# Patient Record
Sex: MALE | Race: WHITE | Employment: OTHER
[De-identification: names, ages, dates, MRNs, and addresses within clinical notes are randomized per-mention and may not be internally consistent; named-entity substitution may affect disease eponyms.]

---

## 2017-01-05 ENCOUNTER — TELEPHONE (OUTPATIENT)
Dept: FAMILY MEDICINE CLINIC | Facility: CLINIC | Age: 52
End: 2017-01-05

## 2017-01-05 RX ORDER — PREDNISONE 20 MG/1
40 TABLET ORAL DAILY
Qty: 10 TABLET | Refills: 0 | Status: SHIPPED | OUTPATIENT
Start: 2017-01-05 | End: 2017-01-10

## 2017-01-06 ENCOUNTER — OFFICE VISIT (OUTPATIENT)
Dept: FAMILY MEDICINE CLINIC | Facility: CLINIC | Age: 52
End: 2017-01-06

## 2017-01-06 VITALS
HEART RATE: 92 BPM | WEIGHT: 315 LBS | DIASTOLIC BLOOD PRESSURE: 86 MMHG | BODY MASS INDEX: 36.45 KG/M2 | SYSTOLIC BLOOD PRESSURE: 140 MMHG | HEIGHT: 78 IN

## 2017-01-06 DIAGNOSIS — M1A.09X0 IDIOPATHIC CHRONIC GOUT OF MULTIPLE SITES WITHOUT TOPHUS: Primary | ICD-10-CM

## 2017-01-06 DIAGNOSIS — I10 ESSENTIAL HYPERTENSION: ICD-10-CM

## 2017-01-06 DIAGNOSIS — F51.01 PRIMARY INSOMNIA: ICD-10-CM

## 2017-01-06 DIAGNOSIS — J06.9 VIRAL UPPER RESPIRATORY TRACT INFECTION: ICD-10-CM

## 2017-01-06 PROCEDURE — 99213 OFFICE O/P EST LOW 20 MIN: CPT | Performed by: FAMILY MEDICINE

## 2017-01-06 RX ORDER — ALLOPURINOL 100 MG/1
100 TABLET ORAL DAILY
Qty: 90 TABLET | Refills: 3 | Status: SHIPPED | OUTPATIENT
Start: 2017-01-06 | End: 2017-03-21 | Stop reason: SDUPTHER

## 2017-01-06 RX ORDER — ALPRAZOLAM 1 MG/1
1 TABLET ORAL NIGHTLY PRN
Qty: 90 TABLET | Refills: 1 | Status: SHIPPED | OUTPATIENT
Start: 2017-01-06 | End: 2017-03-24 | Stop reason: SDUPTHER

## 2017-01-06 ASSESSMENT — ENCOUNTER SYMPTOMS
BLOOD IN STOOL: 0
COUGH: 0
SHORTNESS OF BREATH: 0
CONSTIPATION: 0
DIARRHEA: 0
ABDOMINAL PAIN: 0
BACK PAIN: 0
WHEEZING: 0

## 2017-01-24 RX ORDER — SILDENAFIL 100 MG/1
TABLET, FILM COATED ORAL
Qty: 18 TABLET | Refills: 0 | Status: SHIPPED | OUTPATIENT
Start: 2017-01-24 | End: 2017-10-11 | Stop reason: SDUPTHER

## 2017-03-21 RX ORDER — ALLOPURINOL 100 MG/1
100 TABLET ORAL DAILY
Qty: 90 TABLET | Refills: 3 | Status: SHIPPED | OUTPATIENT
Start: 2017-03-21 | End: 2017-07-14 | Stop reason: SDUPTHER

## 2017-03-24 DIAGNOSIS — F51.01 PRIMARY INSOMNIA: ICD-10-CM

## 2017-03-24 RX ORDER — BISOPROLOL FUMARATE AND HYDROCHLOROTHIAZIDE 5; 6.25 MG/1; MG/1
TABLET ORAL
Qty: 90 TABLET | Refills: 0 | Status: SHIPPED | OUTPATIENT
Start: 2017-03-24 | End: 2017-07-17 | Stop reason: SDUPTHER

## 2017-03-24 RX ORDER — WARFARIN SODIUM 5 MG/1
TABLET ORAL
Qty: 90 TABLET | Refills: 0 | Status: SHIPPED | OUTPATIENT
Start: 2017-03-24 | End: 2017-07-07 | Stop reason: SDUPTHER

## 2017-03-24 RX ORDER — ALPRAZOLAM 1 MG/1
1 TABLET ORAL NIGHTLY PRN
Qty: 90 TABLET | Refills: 0 | Status: SHIPPED | OUTPATIENT
Start: 2017-03-24 | End: 2017-06-13 | Stop reason: SDUPTHER

## 2017-07-07 RX ORDER — WARFARIN SODIUM 5 MG/1
TABLET ORAL
Qty: 90 TABLET | Refills: 1 | Status: SHIPPED | OUTPATIENT
Start: 2017-07-07 | End: 2017-07-14

## 2017-07-14 ENCOUNTER — OFFICE VISIT (OUTPATIENT)
Dept: FAMILY MEDICINE CLINIC | Age: 52
End: 2017-07-14
Payer: MEDICARE

## 2017-07-14 VITALS
BODY MASS INDEX: 35.06 KG/M2 | HEART RATE: 88 BPM | SYSTOLIC BLOOD PRESSURE: 140 MMHG | WEIGHT: 303 LBS | HEIGHT: 78 IN | DIASTOLIC BLOOD PRESSURE: 90 MMHG

## 2017-07-14 DIAGNOSIS — I10 ESSENTIAL HYPERTENSION: ICD-10-CM

## 2017-07-14 DIAGNOSIS — R81 GLUCOSURIA: ICD-10-CM

## 2017-07-14 DIAGNOSIS — E11.9 TYPE 2 DIABETES MELLITUS WITHOUT COMPLICATION, WITHOUT LONG-TERM CURRENT USE OF INSULIN (HCC): ICD-10-CM

## 2017-07-14 DIAGNOSIS — N48.1 BALANITIS: ICD-10-CM

## 2017-07-14 DIAGNOSIS — I87.2 VENOUS (PERIPHERAL) INSUFFICIENCY: ICD-10-CM

## 2017-07-14 DIAGNOSIS — F51.01 PRIMARY INSOMNIA: ICD-10-CM

## 2017-07-14 DIAGNOSIS — D68.59 HYPERCOAGULABLE STATE (HCC): Primary | ICD-10-CM

## 2017-07-14 LAB
ALBUMIN SERPL-MCNC: 4.1 G/DL
ALP BLD-CCNC: 64 U/L
ALT SERPL-CCNC: 40 U/L
AST SERPL-CCNC: 38 U/L
BASOPHILS ABSOLUTE: 0.1 /ΜL
BASOPHILS RELATIVE PERCENT: 1.3 %
BILIRUB SERPL-MCNC: 0.8 MG/DL (ref 0.1–1.4)
BILIRUBIN, POC: ABNORMAL
BLOOD URINE, POC: ABNORMAL
BUN BLDV-MCNC: 14 MG/DL
CALCIUM SERPL-MCNC: 9.3 MG/DL
CHLORIDE BLD-SCNC: 91 MMOL/L
CHOLESTEROL, TOTAL: 377 MG/DL
CHOLESTEROL/HDL RATIO: 9.7
CLARITY, POC: ABNORMAL
CO2: 23 MMOL/L
COLOR, POC: YELLOW
CREAT SERPL-MCNC: 0.83 MG/DL
EOSINOPHILS ABSOLUTE: 0.1 /ΜL
EOSINOPHILS RELATIVE PERCENT: 1.1 %
GFR CALCULATED: >60
GLUCOSE BLD-MCNC: 302 MG/DL
GLUCOSE BLD-MCNC: 316 MG/DL
GLUCOSE URINE, POC: >1000
HBA1C MFR BLD: 14.3 %
HCT VFR BLD CALC: 39.5 % (ref 41–53)
HDLC SERPL-MCNC: 39 MG/DL (ref 35–70)
HEMOGLOBIN: 13.6 G/DL (ref 13.5–17.5)
KETONES, POC: >80
LDL CHOLESTEROL CALCULATED: 286 MG/DL (ref 0–160)
LEUKOCYTE EST, POC: ABNORMAL
LYMPHOCYTES ABSOLUTE: 1.2 /ΜL
LYMPHOCYTES RELATIVE PERCENT: 21.6 %
MCH RBC QN AUTO: 34 PG
MCHC RBC AUTO-ENTMCNC: 34.4 G/DL
MCV RBC AUTO: 99 FL
MONOCYTES ABSOLUTE: 0.9 /ΜL
MONOCYTES RELATIVE PERCENT: 15.9 %
NEUTROPHILS ABSOLUTE: 3.3 /ΜL
NEUTROPHILS RELATIVE PERCENT: 60.1 %
NITRITE, POC: ABNORMAL
PDW BLD-RTO: 13.2 %
PH, POC: 5
PLATELET # BLD: 152 K/ΜL
PMV BLD AUTO: 8.1 FL
POTASSIUM SERPL-SCNC: 4.3 MMOL/L
PROTEIN, POC: 100
RBC # BLD: 3.99 10^6/ΜL
SODIUM BLD-SCNC: 131 MMOL/L
SPECIFIC GRAVITY, POC: 1.02
TOTAL PROTEIN: 7.6
TRIGL SERPL-MCNC: 377 MG/DL
URIC ACID: 6.6
UROBILINOGEN, POC: 0.2
VLDLC SERPL CALC-MCNC: 52 MG/DL
WBC # BLD: 5.5 10^3/ML

## 2017-07-14 PROCEDURE — G8427 DOCREV CUR MEDS BY ELIG CLIN: HCPCS | Performed by: FAMILY MEDICINE

## 2017-07-14 PROCEDURE — 81003 URINALYSIS AUTO W/O SCOPE: CPT | Performed by: FAMILY MEDICINE

## 2017-07-14 PROCEDURE — G8417 CALC BMI ABV UP PARAM F/U: HCPCS | Performed by: FAMILY MEDICINE

## 2017-07-14 PROCEDURE — 3046F HEMOGLOBIN A1C LEVEL >9.0%: CPT | Performed by: FAMILY MEDICINE

## 2017-07-14 PROCEDURE — 99214 OFFICE O/P EST MOD 30 MIN: CPT | Performed by: FAMILY MEDICINE

## 2017-07-14 PROCEDURE — 82962 GLUCOSE BLOOD TEST: CPT | Performed by: FAMILY MEDICINE

## 2017-07-14 PROCEDURE — 3017F COLORECTAL CA SCREEN DOC REV: CPT | Performed by: FAMILY MEDICINE

## 2017-07-14 PROCEDURE — 1036F TOBACCO NON-USER: CPT | Performed by: FAMILY MEDICINE

## 2017-07-14 RX ORDER — ALPRAZOLAM 1 MG/1
TABLET ORAL
Qty: 90 TABLET | Refills: 1 | Status: SHIPPED | OUTPATIENT
Start: 2017-07-14 | End: 2017-12-05 | Stop reason: SDUPTHER

## 2017-07-14 RX ORDER — ALLOPURINOL 100 MG/1
100 TABLET ORAL DAILY
Qty: 90 TABLET | Refills: 3 | Status: SHIPPED | OUTPATIENT
Start: 2017-07-14 | End: 2017-08-14

## 2017-07-14 RX ORDER — NYSTATIN 100000 [USP'U]/G
POWDER TOPICAL
Qty: 15 G | Refills: 1 | Status: SHIPPED | OUTPATIENT
Start: 2017-07-14

## 2017-07-14 ASSESSMENT — ENCOUNTER SYMPTOMS
SHORTNESS OF BREATH: 0
ABDOMINAL PAIN: 0
COUGH: 0
WHEEZING: 0
CONSTIPATION: 0
DIARRHEA: 0
BACK PAIN: 0
BLOOD IN STOOL: 0

## 2017-07-14 ASSESSMENT — PATIENT HEALTH QUESTIONNAIRE - PHQ9
1. LITTLE INTEREST OR PLEASURE IN DOING THINGS: 0
SUM OF ALL RESPONSES TO PHQ QUESTIONS 1-9: 0
2. FEELING DOWN, DEPRESSED OR HOPELESS: 0
SUM OF ALL RESPONSES TO PHQ9 QUESTIONS 1 & 2: 0

## 2017-07-17 RX ORDER — BISOPROLOL FUMARATE AND HYDROCHLOROTHIAZIDE 5; 6.25 MG/1; MG/1
TABLET ORAL
Qty: 90 TABLET | Refills: 2 | Status: SHIPPED | OUTPATIENT
Start: 2017-07-17

## 2017-07-18 DIAGNOSIS — M1A.09X0 IDIOPATHIC CHRONIC GOUT OF MULTIPLE SITES WITHOUT TOPHUS: ICD-10-CM

## 2017-07-18 DIAGNOSIS — I10 ESSENTIAL HYPERTENSION: ICD-10-CM

## 2017-07-18 DIAGNOSIS — E11.9 TYPE 2 DIABETES MELLITUS WITHOUT COMPLICATION, WITHOUT LONG-TERM CURRENT USE OF INSULIN (HCC): ICD-10-CM

## 2017-08-14 ENCOUNTER — OFFICE VISIT (OUTPATIENT)
Dept: FAMILY MEDICINE CLINIC | Age: 52
End: 2017-08-14
Payer: COMMERCIAL

## 2017-08-14 VITALS
RESPIRATION RATE: 14 BRPM | BODY MASS INDEX: 32.96 KG/M2 | WEIGHT: 300 LBS | DIASTOLIC BLOOD PRESSURE: 74 MMHG | HEART RATE: 64 BPM | SYSTOLIC BLOOD PRESSURE: 134 MMHG

## 2017-08-14 DIAGNOSIS — I87.2 VENOUS (PERIPHERAL) INSUFFICIENCY: ICD-10-CM

## 2017-08-14 DIAGNOSIS — E11.9 TYPE 2 DIABETES MELLITUS WITHOUT COMPLICATION, WITHOUT LONG-TERM CURRENT USE OF INSULIN (HCC): Primary | ICD-10-CM

## 2017-08-14 DIAGNOSIS — I10 ESSENTIAL HYPERTENSION: ICD-10-CM

## 2017-08-14 DIAGNOSIS — E78.2 MIXED HYPERLIPIDEMIA: ICD-10-CM

## 2017-08-14 PROCEDURE — 99214 OFFICE O/P EST MOD 30 MIN: CPT | Performed by: FAMILY MEDICINE

## 2017-08-14 PROCEDURE — G8417 CALC BMI ABV UP PARAM F/U: HCPCS | Performed by: FAMILY MEDICINE

## 2017-08-14 PROCEDURE — 3046F HEMOGLOBIN A1C LEVEL >9.0%: CPT | Performed by: FAMILY MEDICINE

## 2017-08-14 PROCEDURE — G8428 CUR MEDS NOT DOCUMENT: HCPCS | Performed by: FAMILY MEDICINE

## 2017-08-14 PROCEDURE — 1036F TOBACCO NON-USER: CPT | Performed by: FAMILY MEDICINE

## 2017-08-14 PROCEDURE — 3017F COLORECTAL CA SCREEN DOC REV: CPT | Performed by: FAMILY MEDICINE

## 2017-08-14 RX ORDER — ALLOPURINOL 300 MG/1
300 TABLET ORAL DAILY
Qty: 90 TABLET | Refills: 1 | Status: SHIPPED | OUTPATIENT
Start: 2017-08-14 | End: 2017-12-11 | Stop reason: SDUPTHER

## 2017-08-14 RX ORDER — ATORVASTATIN CALCIUM 40 MG/1
40 TABLET, FILM COATED ORAL DAILY
Qty: 90 TABLET | Refills: 1 | Status: SHIPPED | OUTPATIENT
Start: 2017-08-14 | End: 2018-01-31 | Stop reason: SDUPTHER

## 2017-08-14 ASSESSMENT — ENCOUNTER SYMPTOMS
EYE DISCHARGE: 0
COLOR CHANGE: 0
CHOKING: 0
PHOTOPHOBIA: 0
VOMITING: 0
CHEST TIGHTNESS: 0
NAUSEA: 0
DIARRHEA: 0
STRIDOR: 0
SHORTNESS OF BREATH: 1
SORE THROAT: 0
WHEEZING: 0
APNEA: 0
ABDOMINAL DISTENTION: 0
EYE ITCHING: 0
EYE REDNESS: 0
COUGH: 0
ABDOMINAL PAIN: 0
EYE PAIN: 0
SINUS PRESSURE: 0
RHINORRHEA: 0
BACK PAIN: 0
BLOOD IN STOOL: 0
CONSTIPATION: 0

## 2017-08-25 RX ORDER — LANCETS 30 GAUGE
EACH MISCELLANEOUS
Qty: 100 EACH | Refills: 3 | Status: SHIPPED | OUTPATIENT
Start: 2017-08-25

## 2017-10-11 RX ORDER — SILDENAFIL 100 MG/1
TABLET, FILM COATED ORAL
Qty: 18 TABLET | Refills: 0 | Status: SHIPPED | OUTPATIENT
Start: 2017-10-11

## 2017-10-11 NOTE — TELEPHONE ENCOUNTER
Health Maintenance   Topic Date Due    Hepatitis C screen  1965    Diabetic foot exam  09/08/1973    Diabetic retinal exam  09/08/1973    HIV screen  09/08/1978    Diabetic microalbuminuria test  09/08/1981    DTaP/Tdap/Td vaccine (1 - Tdap) 09/08/1982    Pneumococcal med risk (1 of 1 - PPSV23) 09/08/1982    Colon cancer screen colonoscopy  09/08/2013    Flu vaccine (1) 09/01/2017    Diabetic hemoglobin A1C test  10/14/2017    Lipid screen  07/14/2018       Hemoglobin A1C (%)   Date Value   07/14/2017 14.3             ( goal A1C is < 7)   No results found for: LABMICR  LDL Calculated (mg/dL)   Date Value   07/14/2017 286 (A)       (goal LDL is <100)   AST (U/L)   Date Value   07/14/2017 38     ALT (U/L)   Date Value   07/14/2017 40     BUN (mg/dL)   Date Value   07/14/2017 14     BP Readings from Last 3 Encounters:   08/14/17 134/74   07/14/17 (!) 140/90   01/06/17 140/86          (goal 120/80)    All Future Testing planned in [facility]  Lab Frequency Next Occurrence   CBC Auto Differential Once 10/12/2017   Comprehensive Metabolic Panel Once 03/37/3540   Lipid Panel Once 10/12/2017   Uric Acid Once 10/12/2017   Microalbumin, Ur Once 11/14/2017   Lipid Panel Once 11/14/2017   Hemoglobin A1C Once 11/14/2017       Next Visit Date:  Future Appointments  Date Time Provider Grady Caal   11/14/2017 3:40 PM Braden Chandra MD [facility]            Patient Active Problem List:     Hypertension     Hypercoagulable state ([facility] [address]     Gout     Venous (peripheral) insufficiency     Type 2 diabetes mellitus without complication, without long-term current use of insulin (HCC)     Mixed hyperlipidemia

## 2017-10-24 ENCOUNTER — TELEPHONE (OUTPATIENT)
Dept: FAMILY MEDICINE CLINIC | Age: 52
End: 2017-10-24
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
## 2017-10-25 DIAGNOSIS — S69.92XA INJURY OF LEFT THUMB, INITIAL ENCOUNTER: Primary | ICD-10-CM

## 2017-10-25 DIAGNOSIS — R07.81 RIB PAIN ON LEFT SIDE: ICD-10-CM

## 2017-10-25 DIAGNOSIS — S29.8XXA BLUNT TRAUMA OF RIB, INITIAL ENCOUNTER: ICD-10-CM

## 2017-11-03 LAB
AVERAGE GLUCOSE: 151
CHOLESTEROL, TOTAL: 201 MG/DL
CHOLESTEROL/HDL RATIO: 3.9
CREATININE URINE: 77.9 MG/DL
HBA1C MFR BLD: 6.9 %
HDLC SERPL-MCNC: 52 MG/DL (ref 35–70)
LDL CHOLESTEROL CALCULATED: 133 MG/DL (ref 0–160)
MICROALBUMIN/CREAT 24H UR: 30.4 MG/G{CREAT}
TRIGL SERPL-MCNC: 81 MG/DL
VLDLC SERPL CALC-MCNC: 16 MG/DL

## 2017-11-07 DIAGNOSIS — E11.9 TYPE 2 DIABETES MELLITUS WITHOUT COMPLICATION, WITHOUT LONG-TERM CURRENT USE OF INSULIN (HCC): ICD-10-CM

## 2017-11-07 DIAGNOSIS — I10 ESSENTIAL HYPERTENSION: ICD-10-CM

## 2017-11-14 ENCOUNTER — TELEPHONE (OUTPATIENT)
Dept: FAMILY MEDICINE CLINIC | Age: 52
End: 2017-11-14

## 2017-11-14 NOTE — TELEPHONE ENCOUNTER
A1c looks pretty good at 6.9% and lipids are ok. His microalbumin was abnormal and he needs follow up appt as we likely need to change his BP meds to better protect his kidneys from DM. If he has been on Janumet then he needs to continue as his DM is under pretty good control with his current medications.

## 2017-11-16 NOTE — TELEPHONE ENCOUNTER
Health Maintenance   Topic Date Due    Hepatitis C screen  1965    Diabetic foot exam  09/08/1973    Diabetic retinal exam  09/08/1973    HIV screen  09/08/1978    DTaP/Tdap/Td vaccine (1 - Tdap) 09/08/1982    Pneumococcal med risk (1 of 1 - PPSV23) 09/08/1982    Colon cancer screen colonoscopy  09/08/2013    Flu vaccine (1) 09/01/2017    Diabetic hemoglobin A1C test  11/03/2018    Diabetic microalbuminuria test  11/03/2018    Lipid screen  11/03/2018       Hemoglobin A1C (%)   Date Value   11/03/2017 6.9   07/14/2017 14.3             ( goal A1C is < 7)   No results found for: LABMICR  LDL Calculated (mg/dL)   Date Value   11/03/2017 133       (goal LDL is <100)   AST (U/L)   Date Value   07/14/2017 38     ALT (U/L)   Date Value   07/14/2017 40     BUN (mg/dL)   Date Value   07/14/2017 14     BP Readings from Last 3 Encounters:   08/14/17 134/74   07/14/17 (!) 140/90   01/06/17 140/86          (goal 120/80)    All Future Testing planned in [facility]  Lab Frequency Next Occurrence   CBC Auto Differential Once 11/15/2017   Comprehensive Metabolic Panel Once 45/65/8813   Uric Acid Once 11/15/2017   Lipid Panel Once 11/14/2017   XR FINGER LEFT (MIN 2 VIEWS) Once 10/25/2017   XR RIBS BILATERAL (MIN 4 VIEWS) Once 10/25/2017       Next Visit Date:  Future Appointments  Date Time Provider Grady Caal   11/21/2017 2:50 PM Russ Merritt MD [facility] [address] [facility]            Patient Active Problem List:     Hypertension     Hypercoagulable state ([facility] [address]     Gout     Venous (peripheral) insufficiency     Type 2 diabetes mellitus without complication, without long-term current use of insulin (HCC)     Mixed hyperlipidemia

## 2017-11-21 ENCOUNTER — OFFICE VISIT (OUTPATIENT)
Dept: FAMILY MEDICINE CLINIC | Age: 52
End: 2017-11-21
Payer: COMMERCIAL

## 2017-11-21 VITALS
HEART RATE: 84 BPM | BODY MASS INDEX: 35.37 KG/M2 | DIASTOLIC BLOOD PRESSURE: 94 MMHG | WEIGHT: 315 LBS | SYSTOLIC BLOOD PRESSURE: 170 MMHG

## 2017-11-21 DIAGNOSIS — E78.2 MIXED HYPERLIPIDEMIA: ICD-10-CM

## 2017-11-21 DIAGNOSIS — E11.9 TYPE 2 DIABETES MELLITUS WITHOUT COMPLICATION, WITHOUT LONG-TERM CURRENT USE OF INSULIN (HCC): ICD-10-CM

## 2017-11-21 DIAGNOSIS — I10 ESSENTIAL HYPERTENSION: Primary | ICD-10-CM

## 2017-11-21 PROCEDURE — G8427 DOCREV CUR MEDS BY ELIG CLIN: HCPCS | Performed by: FAMILY MEDICINE

## 2017-11-21 PROCEDURE — G8417 CALC BMI ABV UP PARAM F/U: HCPCS | Performed by: FAMILY MEDICINE

## 2017-11-21 PROCEDURE — G8484 FLU IMMUNIZE NO ADMIN: HCPCS | Performed by: FAMILY MEDICINE

## 2017-11-21 PROCEDURE — 1036F TOBACCO NON-USER: CPT | Performed by: FAMILY MEDICINE

## 2017-11-21 PROCEDURE — 3017F COLORECTAL CA SCREEN DOC REV: CPT | Performed by: FAMILY MEDICINE

## 2017-11-21 PROCEDURE — 99213 OFFICE O/P EST LOW 20 MIN: CPT | Performed by: FAMILY MEDICINE

## 2017-11-21 PROCEDURE — 3044F HG A1C LEVEL LT 7.0%: CPT | Performed by: FAMILY MEDICINE

## 2017-11-21 RX ORDER — SILDENAFIL CITRATE 20 MG/1
60 TABLET ORAL PRN
Qty: 60 TABLET | Refills: 3 | Status: SHIPPED | OUTPATIENT
Start: 2017-11-21

## 2017-11-21 RX ORDER — LISINOPRIL 10 MG/1
10 TABLET ORAL DAILY
Qty: 90 TABLET | Refills: 3 | Status: SHIPPED | OUTPATIENT
Start: 2017-11-21 | End: 2018-08-28 | Stop reason: DRUGHIGH

## 2017-11-21 ASSESSMENT — ENCOUNTER SYMPTOMS
BACK PAIN: 0
ABDOMINAL PAIN: 0
WHEEZING: 0
COUGH: 0
BLOOD IN STOOL: 0
CONSTIPATION: 0
DIARRHEA: 0
SHORTNESS OF BREATH: 0

## 2017-11-21 NOTE — PROGRESS NOTES
Samantha Amaya is a 45 y.o. male who presents today for his medical conditions/complaints as noted below. Samantha Amaya is c/o of Hypertension; Diabetes; Health Maintenance (flu, colon, foot, adacel); and Discuss Labs    Routine follow up on BP and PL A1c looks good. HPI:     DIABETES and HYPERTENSION visit    BP Readings from Last 3 Encounters:   11/21/17 (!) 170/94   08/14/17 134/74   07/14/17 (!) 140/90        Hemoglobin A1C (%)   Date Value   11/03/2017 6.9   07/14/2017 14.3     LDL Calculated (mg/dL)   Date Value   11/03/2017 133     HDL (mg/dL)   Date Value   11/03/2017 52     BUN (mg/dL)   Date Value   07/14/2017 14     CREATININE (no units)   Date Value   07/14/2017 0.83     Glucose (mg/dL)   Date Value   07/14/2017 316            Have you changed or started any medications since your last visit including any over-the-counter medicines, vitamins, or herbal medicines? no   Have you stopped taking any of your medications? Is so, why? -  no  Are you having any side effects from any of your medications? - no    Have you seen any other physician or provider since your last visit?  no   Have you had any other diagnostic tests since your last visit?  no   Have you been seen in the emergency room and/or had an admission in a hospital since we last saw you?  no   Have you had your routine dental cleaning in the past 6 months?  yes -      Have you had your annual diabetic retinal (eye) exam? Yes   (ensure copy of exam is in the chart)    Do you have an active MyChart account? If no, what is the barrier?   No:     Patient Care Team:  Russ Merritt MD as PCP - General (Family Medicine)  Russ Merritt MD as PCP - S Attributed Provider    Medical History Review  Past Medical, Family, and Social History reviewed and  contribute to the patient presenting condition    Health Maintenance   Topic Date Due    Hepatitis C screen  1965    Diabetic foot exam  09/08/1973    Diabetic retinal exam 1    nystatin (MYCOSTATIN) 249686 UNIT/GM powder Apply 3 times daily. 15 g 1    clopidogrel (PLAVIX) 75 MG tablet Take 75 mg by mouth daily. Dose depends on INR       No current facility-administered medications for this visit. No Known Allergies      Subjective:   Review of Systems   Constitutional: Negative for chills, diaphoresis, fatigue and fever. HENT: Negative for congestion and hearing loss. Eyes: Negative for visual disturbance. Respiratory: Negative for cough, shortness of breath and wheezing. Cardiovascular: Negative for chest pain, palpitations and leg swelling. Gastrointestinal: Negative for abdominal pain, blood in stool, constipation and diarrhea. Genitourinary: Negative for dysuria. Musculoskeletal: Negative for arthralgias, back pain, gait problem and neck pain. Skin: Negative for rash. Neurological: Negative for weakness, numbness and headaches. Psychiatric/Behavioral: Negative for dysphoric mood and sleep disturbance. Objective:   BP (!) 170/94   Pulse 84   Wt (!) 322 lb (146.1 kg)   BMI 35.37 kg/m²     Physical Exam   Constitutional: He is oriented to person, place, and time. He appears well-developed and well-nourished. No distress. HENT:   Head: Normocephalic and atraumatic. Mouth/Throat: No oropharyngeal exudate. Eyes: Right eye exhibits no discharge. Left eye exhibits no discharge. No scleral icterus. Neck: Neck supple. Carotid bruit is not present. No thyromegaly present. Cardiovascular: Normal rate, regular rhythm and normal heart sounds. Exam reveals no gallop and no friction rub. No murmur heard. Pulmonary/Chest: Breath sounds normal. No respiratory distress. He has no wheezes. He has no rales. He exhibits no tenderness. Abdominal: There is no tenderness. Musculoskeletal: He exhibits no edema or tenderness. Lymphadenopathy:     He has no cervical adenopathy. Neurological: He is alert and oriented to person, place, and time.  No cranial nerve deficit. Coordination normal.   Skin: No rash noted. He is not diaphoretic. Psychiatric: He has a normal mood and affect. His behavior is normal. Judgment and thought content normal.       Assessment:      1. Essential hypertension  CBC Auto Differential    Lipid Panel    Uric Acid   2. Type 2 diabetes mellitus without complication, without long-term current use of insulin ([facility])  Comprehensive Metabolic Panel    Lipid Panel    Hemoglobin A1C   3. Mixed hyperlipidemia  Comprehensive Metabolic Panel    Lipid Panel         Plan:      Return in about 6 months (around 5/21/2018). Orders Placed This Encounter   Procedures    CBC Auto Differential     Standing Status:   Future     Standing Expiration Date:   11/21/2018    Comprehensive Metabolic Panel     Standing Status:   Future     Standing Expiration Date:   11/21/2018    Lipid Panel     Standing Status:   Future     Standing Expiration Date:   11/21/2018     Order Specific Question:   Is Patient Fasting?/# of Hours     Answer:   12 hour fast    Hemoglobin A1C     Standing Status:   Future     Standing Expiration Date:   11/21/2018    Uric Acid     Standing Status:   Future     Standing Expiration Date:   11/21/2018     Orders Placed This Encounter   Medications    lisinopril (PRINIVIL;ZESTRIL) 10 MG tablet     Sig: Take 1 tablet by mouth daily     Dispense:  90 tablet     Refill:  3    sildenafil (REVATIO) 20 MG tablet     Sig: Take 3 tablets by mouth as needed (ED)     Dispense:  60 tablet     Refill:  3     Stop the ziac in favor of lisinopril. He declines the flu vaccine.

## 2017-12-05 DIAGNOSIS — F51.01 PRIMARY INSOMNIA: ICD-10-CM

## 2017-12-05 RX ORDER — ALPRAZOLAM 1 MG/1
TABLET ORAL
Qty: 90 TABLET | Refills: 1 | Status: SHIPPED | OUTPATIENT
Start: 2017-12-05 | End: 2018-04-10 | Stop reason: SDUPTHER

## 2017-12-11 RX ORDER — ALLOPURINOL 300 MG/1
300 TABLET ORAL DAILY
Qty: 90 TABLET | Refills: 1 | Status: SHIPPED | OUTPATIENT
Start: 2017-12-11 | End: 2018-07-09 | Stop reason: SDUPTHER

## 2017-12-11 NOTE — TELEPHONE ENCOUNTER
Health Maintenance   Topic Date Due    Hepatitis C screen  1965    Diabetic foot exam  09/08/1973    Diabetic retinal exam  09/08/1973    HIV screen  09/08/1978    DTaP/Tdap/Td vaccine (1 - Tdap) 09/08/1982    Pneumococcal med risk (1 of 1 - PPSV23) 09/08/1982    Colon cancer screen colonoscopy  09/08/2013    Flu vaccine (1) 09/01/2017    Diabetic hemoglobin A1C test  11/03/2018    Diabetic microalbuminuria test  11/03/2018    Lipid screen  11/03/2018       Hemoglobin A1C (%)   Date Value   11/03/2017 6.9   07/14/2017 14.3             ( goal A1C is < 7)   No results found for: LABMICR  LDL Calculated (mg/dL)   Date Value   11/03/2017 133       (goal LDL is <100)   AST (U/L)   Date Value   07/14/2017 38     ALT (U/L)   Date Value   07/14/2017 40     BUN (mg/dL)   Date Value   07/14/2017 14     BP Readings from Last 3 Encounters:   11/21/17 (!) 170/94   08/14/17 134/74   07/14/17 (!) 140/90          (goal 120/80)    All Future Testing planned in [facility]  Lab Frequency Next Occurrence   CBC Auto Differential Once 11/15/2017   Comprehensive Metabolic Panel Once 15/62/7707   Uric Acid Once 11/15/2017   Lipid Panel Once 11/14/2017   XR FINGER LEFT (MIN 2 VIEWS) Once 10/25/2017   XR RIBS BILATERAL (MIN 4 VIEWS) Once 10/25/2017   CBC Auto Differential Once 05/21/2018   Comprehensive Metabolic Panel Once 75/42/5599   Lipid Panel Once 05/21/2018   Hemoglobin A1C Once 05/21/2018   Uric Acid Once 05/21/2018       Next Visit Date:  Future Appointments  Date Time Provider Grady Caal   5/23/2018 1:10 PM MD [facility]            Patient Active Problem List:     Hypertension     Hypercoagulable state ([facility] [address]     Gout     Venous (peripheral) insufficiency     Type 2 diabetes mellitus without complication, without long-term current use of insulin (HCC)     Mixed hyperlipidemia

## 2018-01-31 RX ORDER — ATORVASTATIN CALCIUM 40 MG/1
TABLET, FILM COATED ORAL
Qty: 90 TABLET | Refills: 3 | Status: SHIPPED | OUTPATIENT
Start: 2018-01-31

## 2018-04-10 DIAGNOSIS — F51.01 PRIMARY INSOMNIA: ICD-10-CM

## 2018-04-10 RX ORDER — ALPRAZOLAM 1 MG/1
TABLET ORAL
Qty: 90 TABLET | Refills: 1 | Status: SHIPPED | OUTPATIENT
Start: 2018-04-10 | End: 2018-05-04 | Stop reason: SDUPTHER

## 2018-05-04 DIAGNOSIS — F51.01 PRIMARY INSOMNIA: ICD-10-CM

## 2018-05-04 RX ORDER — ALPRAZOLAM 1 MG/1
TABLET ORAL
Qty: 90 TABLET | Refills: 1 | Status: SHIPPED | OUTPATIENT
Start: 2018-05-04 | End: 2018-09-20 | Stop reason: SDUPTHER

## 2018-05-21 LAB
ALBUMIN SERPL-MCNC: 4.2 G/DL
ALP BLD-CCNC: 67 U/L
ALT SERPL-CCNC: 46 U/L
ANION GAP SERPL CALCULATED.3IONS-SCNC: 14 MMOL/L
AST SERPL-CCNC: 48 U/L
AVERAGE GLUCOSE: 128
BASOPHILS ABSOLUTE: 0.1 /ΜL
BASOPHILS RELATIVE PERCENT: 1.3 %
BILIRUB SERPL-MCNC: 0.6 MG/DL (ref 0.1–1.4)
BUN BLDV-MCNC: 12 MG/DL
CALCIUM SERPL-MCNC: 10 MG/DL
CHLORIDE BLD-SCNC: 101 MMOL/L
CHOLESTEROL, TOTAL: 197 MG/DL
CHOLESTEROL/HDL RATIO: 2.5
CO2: 25 MMOL/L
CREAT SERPL-MCNC: 0.38 MG/DL
EOSINOPHILS ABSOLUTE: 0.1 /ΜL
EOSINOPHILS RELATIVE PERCENT: 1.5 %
GFR CALCULATED: >60
GLUCOSE BLD-MCNC: 115 MG/DL
HBA1C MFR BLD: 6.1 %
HCT VFR BLD CALC: 38.3 % (ref 41–53)
HDLC SERPL-MCNC: 78 MG/DL (ref 35–70)
HEMOGLOBIN: 13.2 G/DL (ref 13.5–17.5)
LDL CHOLESTEROL CALCULATED: 108 MG/DL (ref 0–160)
LYMPHOCYTES ABSOLUTE: 1.2 /ΜL
LYMPHOCYTES RELATIVE PERCENT: 18.3 %
MCH RBC QN AUTO: 35.8 PG
MCHC RBC AUTO-ENTMCNC: 34.6 G/DL
MCV RBC AUTO: 104 FL
MONOCYTES ABSOLUTE: 1 /ΜL
MONOCYTES RELATIVE PERCENT: 14.7 %
NEUTROPHILS ABSOLUTE: 4.3 /ΜL
NEUTROPHILS RELATIVE PERCENT: 64.2 %
PDW BLD-RTO: 13.5 %
PLATELET # BLD: 207 K/ΜL
PMV BLD AUTO: 7.6 FL
POTASSIUM SERPL-SCNC: 4.8 MMOL/L
RBC # BLD: 3.7 10^6/ΜL
SODIUM BLD-SCNC: 140 MMOL/L
TOTAL PROTEIN: 7.7
TRIGL SERPL-MCNC: 53 MG/DL
URIC ACID: 4.4
VLDLC SERPL CALC-MCNC: 11 MG/DL
WBC # BLD: 6.7 10^3/ML

## 2018-05-22 DIAGNOSIS — E11.9 TYPE 2 DIABETES MELLITUS WITHOUT COMPLICATION, WITHOUT LONG-TERM CURRENT USE OF INSULIN (HCC): ICD-10-CM

## 2018-05-22 DIAGNOSIS — E78.2 MIXED HYPERLIPIDEMIA: ICD-10-CM

## 2018-05-22 DIAGNOSIS — I10 ESSENTIAL HYPERTENSION: ICD-10-CM

## 2018-06-01 ENCOUNTER — OFFICE VISIT (OUTPATIENT)
Dept: FAMILY MEDICINE CLINIC | Age: 53
End: 2018-06-01
Payer: MEDICARE

## 2018-06-01 VITALS
SYSTOLIC BLOOD PRESSURE: 154 MMHG | BODY MASS INDEX: 36.03 KG/M2 | HEART RATE: 74 BPM | WEIGHT: 315 LBS | DIASTOLIC BLOOD PRESSURE: 84 MMHG

## 2018-06-01 DIAGNOSIS — Z12.11 COLON CANCER SCREENING: ICD-10-CM

## 2018-06-01 DIAGNOSIS — E78.2 MIXED HYPERLIPIDEMIA: ICD-10-CM

## 2018-06-01 DIAGNOSIS — E11.9 TYPE 2 DIABETES MELLITUS WITHOUT COMPLICATION, WITHOUT LONG-TERM CURRENT USE OF INSULIN (HCC): Primary | ICD-10-CM

## 2018-06-01 DIAGNOSIS — F32.A DEPRESSION, UNSPECIFIED DEPRESSION TYPE: ICD-10-CM

## 2018-06-01 PROCEDURE — 3017F COLORECTAL CA SCREEN DOC REV: CPT | Performed by: FAMILY MEDICINE

## 2018-06-01 PROCEDURE — 99213 OFFICE O/P EST LOW 20 MIN: CPT | Performed by: FAMILY MEDICINE

## 2018-06-01 PROCEDURE — 1036F TOBACCO NON-USER: CPT | Performed by: FAMILY MEDICINE

## 2018-06-01 PROCEDURE — 3046F HEMOGLOBIN A1C LEVEL >9.0%: CPT | Performed by: FAMILY MEDICINE

## 2018-06-01 PROCEDURE — G8417 CALC BMI ABV UP PARAM F/U: HCPCS | Performed by: FAMILY MEDICINE

## 2018-06-01 PROCEDURE — 2022F DILAT RTA XM EVC RTNOPTHY: CPT | Performed by: FAMILY MEDICINE

## 2018-06-01 PROCEDURE — G8427 DOCREV CUR MEDS BY ELIG CLIN: HCPCS | Performed by: FAMILY MEDICINE

## 2018-06-01 RX ORDER — CITALOPRAM 40 MG/1
40 TABLET ORAL EVERY EVENING
Qty: 90 TABLET | Refills: 3 | Status: SHIPPED | OUTPATIENT
Start: 2018-06-01

## 2018-06-01 ASSESSMENT — ENCOUNTER SYMPTOMS
COUGH: 0
CONSTIPATION: 0
BACK PAIN: 0
ABDOMINAL PAIN: 0
WHEEZING: 0
SHORTNESS OF BREATH: 0
DIARRHEA: 0
BLOOD IN STOOL: 0

## 2018-06-27 RX ORDER — WARFARIN SODIUM 5 MG/1
TABLET ORAL
Qty: 90 TABLET | Refills: 3 | Status: SHIPPED | OUTPATIENT
Start: 2018-06-27

## 2018-07-10 RX ORDER — ALLOPURINOL 300 MG/1
TABLET ORAL
Qty: 90 TABLET | Refills: 3 | Status: SHIPPED | OUTPATIENT
Start: 2018-07-10

## 2018-07-17 ENCOUNTER — OFFICE VISIT (OUTPATIENT)
Dept: FAMILY MEDICINE CLINIC | Age: 53
End: 2018-07-17
Payer: MEDICARE

## 2018-07-17 VITALS
BODY MASS INDEX: 36.45 KG/M2 | SYSTOLIC BLOOD PRESSURE: 200 MMHG | WEIGHT: 315 LBS | HEIGHT: 78 IN | HEART RATE: 124 BPM | DIASTOLIC BLOOD PRESSURE: 100 MMHG

## 2018-07-17 DIAGNOSIS — E78.2 MIXED HYPERLIPIDEMIA: ICD-10-CM

## 2018-07-17 DIAGNOSIS — E11.9 TYPE 2 DIABETES MELLITUS WITHOUT COMPLICATION, WITHOUT LONG-TERM CURRENT USE OF INSULIN (HCC): ICD-10-CM

## 2018-07-17 DIAGNOSIS — I10 ESSENTIAL HYPERTENSION: Primary | ICD-10-CM

## 2018-07-17 DIAGNOSIS — F10.10 ALCOHOL ABUSE: ICD-10-CM

## 2018-07-17 PROCEDURE — 2022F DILAT RTA XM EVC RTNOPTHY: CPT | Performed by: FAMILY MEDICINE

## 2018-07-17 PROCEDURE — 1036F TOBACCO NON-USER: CPT | Performed by: FAMILY MEDICINE

## 2018-07-17 PROCEDURE — G8427 DOCREV CUR MEDS BY ELIG CLIN: HCPCS | Performed by: FAMILY MEDICINE

## 2018-07-17 PROCEDURE — 3017F COLORECTAL CA SCREEN DOC REV: CPT | Performed by: FAMILY MEDICINE

## 2018-07-17 PROCEDURE — G8417 CALC BMI ABV UP PARAM F/U: HCPCS | Performed by: FAMILY MEDICINE

## 2018-07-17 PROCEDURE — 3044F HG A1C LEVEL LT 7.0%: CPT | Performed by: FAMILY MEDICINE

## 2018-07-17 PROCEDURE — 99213 OFFICE O/P EST LOW 20 MIN: CPT | Performed by: FAMILY MEDICINE

## 2018-07-17 ASSESSMENT — ENCOUNTER SYMPTOMS
ABDOMINAL PAIN: 0
BLOOD IN STOOL: 0
BACK PAIN: 0
COUGH: 0
SHORTNESS OF BREATH: 0
DIARRHEA: 0
CONSTIPATION: 0
WHEEZING: 0

## 2018-07-17 NOTE — PROGRESS NOTES
Oriana Woods is a 45 y.o. male who presents today for his medical conditions/complaints as noted below. Oriana Woods is c/o of Diabetes; Anxiety (follow up); and Fall (left arm pain)  Follow up on PL he admits to poor compliance with medications and admits to drinking too much alcohol. HPI:     Visit Information    Have you changed or started any medications since your last visit including any over-the-counter medicines, vitamins, or herbal medicines? no   Are you having any side effects from any of your medications? -  no  Have you stopped taking any of your medications? Is so, why? -  no    Have you seen any other physician or provider since your last visit? No  Have you had any other diagnostic tests since your last visit? No  Have you been seen in the emergency room and/or had an admission to a hospital since we last saw you? No  Have you had your routine dental cleaning in the past 6 months? yes -     Have you activated your [employer] account? If not, what are your barriers?  No:      Patient Care Team:  Xuan Yi MD as PCP - General (Family Medicine)  Xuan Yi MD as PCP - [facility] Attributed Provider    Medical History Review  Past Medical, Family, and Social History reviewed and  contribute to the patient presenting condition    Health Maintenance   Topic Date Due    Hepatitis C screen  1965    Diabetic retinal exam  09/08/1973    HIV screen  09/08/1978    DTaP/Tdap/Td vaccine (1 - Tdap) 09/08/1982    Shingles Vaccine (1 of 2 - 2 Dose Series) 09/08/2013    Pneumococcal med risk (1 of 1 - PPSV23) 12/31/2019 (Originally 9/8/1982)    Flu vaccine (1) 09/01/2018    Diabetic microalbuminuria test  11/03/2018    A1C test (Diabetic or Prediabetic)  05/21/2019    Lipid screen  05/21/2019    Potassium monitoring  05/21/2019    Creatinine monitoring  05/21/2019    Diabetic foot exam  06/01/2019    Colon cancer screen fecal DNA test (Cologuard)  06/22/2021       Past Medical History:   Diagnosis Date    Gout     HTN (hypertension)     Venous insufficiency (chronic) (peripheral)       No past surgical history on file. No family history on file. Social History   Substance Use Topics    Smoking status: Former Smoker    Smokeless tobacco: Never Used    Alcohol use Not on file      Current Outpatient Prescriptions   Medication Sig Dispense Refill    allopurinol (ZYLOPRIM) 300 MG tablet TAKE 1 TABLET DAILY 90 tablet 3    JANTOVEN 5 MG tablet TAKE AS DIRECTED 90 tablet 3    citalopram (CELEXA) 40 MG tablet Take 1 tablet by mouth every evening 90 tablet 3    ALPRAZolam (XANAX) 1 MG tablet TAKE 1 TABLET NIGHTLY AS   NEEDED FOR SLEEP. . 90 tablet 1    atorvastatin (LIPITOR) 40 MG tablet TAKE 1 TABLET DAILY 90 tablet 3    lisinopril (PRINIVIL;ZESTRIL) 10 MG tablet Take 1 tablet by mouth daily 90 tablet 3    sildenafil (REVATIO) 20 MG tablet Take 3 tablets by mouth as needed (ED) 60 tablet 3    glucose blood VI test strips (ASCENSIA AUTODISC VI;ONE TOUCH ULTRA TEST VI) strip Use with associated glucose meter. Use q day 100 strip 3    SITagliptin-metFORMIN (JANUMET XR)  MG TB24 per extended release tablet Take 2 tablets by mouth daily 180 tablet 3    sildenafil (VIAGRA) 100 MG tablet TAKE 1 TABLET AS NEEDED FOR ERECTILE DYSFUNCTION 18 tablet 0    Blood Glucose Monitoring Suppl (ACURA BLOOD GLUCOSE METER) w/Device KIT 1 kit by Does not apply route daily 1 kit 0    Lancets MISC The patient is testing QD. 100 each 3    bisoprolol-hydrochlorothiazide (ZIAC) 5-6.25 MG per tablet TAKE 1 TABLET DAILY 90 tablet 2    nystatin (MYCOSTATIN) 405604 UNIT/GM powder Apply 3 times daily. 15 g 1    clopidogrel (PLAVIX) 75 MG tablet Take 75 mg by mouth daily. Dose depends on INR       No current facility-administered medications for this visit. No Known Allergies      Subjective:   Review of Systems   Constitutional: Negative for chills, diaphoresis, fatigue and fever.    HENT:

## 2018-08-17 LAB
ALBUMIN SERPL-MCNC: 4.2 G/DL
ALP BLD-CCNC: 61 U/L
ALT SERPL-CCNC: 59 U/L
ANION GAP SERPL CALCULATED.3IONS-SCNC: 11 MMOL/L
AST SERPL-CCNC: 68 U/L
AVERAGE GLUCOSE: 123
AVERAGE GLUCOSE: 123
BASOPHILS ABSOLUTE: 0.1 /ΜL
BASOPHILS RELATIVE PERCENT: 1.1 %
BILIRUB SERPL-MCNC: 1.1 MG/DL (ref 0.1–1.4)
BUN BLDV-MCNC: 19 MG/DL
CALCIUM SERPL-MCNC: 9.9 MG/DL
CHLORIDE BLD-SCNC: 98 MMOL/L
CHOLESTEROL, TOTAL: 138 MG/DL
CHOLESTEROL/HDL RATIO: 2.8
CO2: 26 MMOL/L
CREAT SERPL-MCNC: 0.89 MG/DL
EOSINOPHILS ABSOLUTE: 0.1 /ΜL
EOSINOPHILS RELATIVE PERCENT: 1.9 %
GFR CALCULATED: >60
GLUCOSE BLD-MCNC: 108 MG/DL
HBA1C MFR BLD: 5.9 %
HBA1C MFR BLD: 5.9 %
HCT VFR BLD CALC: 38.9 % (ref 41–53)
HDLC SERPL-MCNC: 49 MG/DL (ref 35–70)
HEMOGLOBIN: 13.3 G/DL (ref 13.5–17.5)
LDL CHOLESTEROL CALCULATED: 77 MG/DL (ref 0–160)
LYMPHOCYTES ABSOLUTE: 1.2 /ΜL
LYMPHOCYTES RELATIVE PERCENT: 16.6 %
MCH RBC QN AUTO: 35.9 PG
MCHC RBC AUTO-ENTMCNC: 34.3 G/DL
MCV RBC AUTO: 105 FL
MONOCYTES ABSOLUTE: 1 /ΜL
MONOCYTES RELATIVE PERCENT: 13.9 %
NEUTROPHILS ABSOLUTE: 4.8 /ΜL
NEUTROPHILS RELATIVE PERCENT: 66.5 %
PDW BLD-RTO: 13.4 %
PLATELET # BLD: 211 K/ΜL
PMV BLD AUTO: 8.2 FL
POTASSIUM SERPL-SCNC: 4.4 MMOL/L
PROSTATE SPECIFIC ANTIGEN: 1.56 NG/ML
RBC # BLD: 3.71 10^6/ΜL
SODIUM BLD-SCNC: 135 MMOL/L
TOTAL PROTEIN: 7.4
TRIGL SERPL-MCNC: 58 MG/DL
VLDLC SERPL CALC-MCNC: 12 MG/DL
WBC # BLD: 7.3 10^3/ML

## 2018-08-23 DIAGNOSIS — I10 ESSENTIAL HYPERTENSION: ICD-10-CM

## 2018-08-23 DIAGNOSIS — E78.2 MIXED HYPERLIPIDEMIA: ICD-10-CM

## 2018-08-23 DIAGNOSIS — E11.9 TYPE 2 DIABETES MELLITUS WITHOUT COMPLICATION, WITHOUT LONG-TERM CURRENT USE OF INSULIN (HCC): ICD-10-CM

## 2018-08-28 ENCOUNTER — OFFICE VISIT (OUTPATIENT)
Dept: FAMILY MEDICINE CLINIC | Age: 53
End: 2018-08-28
Payer: COMMERCIAL

## 2018-08-28 VITALS
BODY MASS INDEX: 34.49 KG/M2 | SYSTOLIC BLOOD PRESSURE: 144 MMHG | DIASTOLIC BLOOD PRESSURE: 96 MMHG | WEIGHT: 314 LBS | HEART RATE: 86 BPM

## 2018-08-28 DIAGNOSIS — D68.59 HYPERCOAGULABLE STATE (HCC): ICD-10-CM

## 2018-08-28 DIAGNOSIS — I10 ESSENTIAL HYPERTENSION: Primary | ICD-10-CM

## 2018-08-28 PROCEDURE — G8427 DOCREV CUR MEDS BY ELIG CLIN: HCPCS | Performed by: FAMILY MEDICINE

## 2018-08-28 PROCEDURE — 3017F COLORECTAL CA SCREEN DOC REV: CPT | Performed by: FAMILY MEDICINE

## 2018-08-28 PROCEDURE — 1036F TOBACCO NON-USER: CPT | Performed by: FAMILY MEDICINE

## 2018-08-28 PROCEDURE — 99213 OFFICE O/P EST LOW 20 MIN: CPT | Performed by: FAMILY MEDICINE

## 2018-08-28 PROCEDURE — G8417 CALC BMI ABV UP PARAM F/U: HCPCS | Performed by: FAMILY MEDICINE

## 2018-08-28 RX ORDER — LISINOPRIL 20 MG/1
20 TABLET ORAL DAILY
Qty: 90 TABLET | Refills: 3 | Status: SHIPPED | OUTPATIENT
Start: 2018-08-28

## 2018-08-28 RX ORDER — LISINOPRIL 20 MG/1
20 TABLET ORAL DAILY
Qty: 30 TABLET | Refills: 5 | Status: SHIPPED | OUTPATIENT
Start: 2018-08-28 | End: 2018-09-20 | Stop reason: SDUPTHER

## 2018-08-28 ASSESSMENT — ENCOUNTER SYMPTOMS
ABDOMINAL PAIN: 0
BACK PAIN: 1
WHEEZING: 0
DIARRHEA: 0
BLOOD IN STOOL: 0
CONSTIPATION: 0
SHORTNESS OF BREATH: 0
COUGH: 0

## 2018-08-28 NOTE — PROGRESS NOTES
Blood Glucose Monitoring Suppl (ACURA BLOOD GLUCOSE METER) w/Device KIT 1 kit by Does not apply route daily 1 kit 0    Lancets MISC The patient is testing QD. 100 each 3    bisoprolol-hydrochlorothiazide (ZIAC) 5-6.25 MG per tablet TAKE 1 TABLET DAILY 90 tablet 2    nystatin (MYCOSTATIN) 669155 UNIT/GM powder Apply 3 times daily. 15 g 1    clopidogrel (PLAVIX) 75 MG tablet Take 75 mg by mouth daily. Dose depends on INR       No current facility-administered medications for this visit. No Known Allergies      Subjective:   Review of Systems   Constitutional: Negative for chills, diaphoresis, fatigue and fever. HENT: Negative for congestion and hearing loss. Eyes: Negative for visual disturbance. Respiratory: Negative for cough, shortness of breath and wheezing. Cardiovascular: Negative for chest pain, palpitations and leg swelling. Gastrointestinal: Negative for abdominal pain, blood in stool, constipation and diarrhea. Genitourinary: Negative for dysuria. Musculoskeletal: Positive for arthralgias and back pain. Negative for gait problem and neck pain. Skin: Negative for rash. Neurological: Negative for weakness, numbness and headaches. Psychiatric/Behavioral: Positive for agitation and decreased concentration. Negative for dysphoric mood and sleep disturbance. The patient is nervous/anxious. Objective:   BP (!) 144/96   Pulse 86   Wt (!) 314 lb (142.4 kg)   BMI 34.49 kg/m²     Physical Exam   Constitutional: He is oriented to person, place, and time. He appears well-developed and well-nourished. No distress. HENT:   Head: Normocephalic and atraumatic. Mouth/Throat: No oropharyngeal exudate. Eyes: Right eye exhibits no discharge. Left eye exhibits no discharge. No scleral icterus. Neck: Neck supple. Carotid bruit is not present. No thyromegaly present. Cardiovascular: Normal rate, regular rhythm and normal heart sounds. Exam reveals no gallop and no friction rub.

## 2018-08-29 ENCOUNTER — TELEPHONE (OUTPATIENT)
Dept: PHARMACY | Age: 53
End: 2018-08-29

## 2018-09-20 DIAGNOSIS — F51.01 PRIMARY INSOMNIA: ICD-10-CM

## 2018-09-20 RX ORDER — ALPRAZOLAM 1 MG/1
TABLET ORAL
Qty: 90 TABLET | Refills: 1 | Status: SHIPPED | OUTPATIENT
Start: 2018-09-20 | End: 2019-09-20

## 2018-09-20 RX ORDER — LISINOPRIL 20 MG/1
20 TABLET ORAL DAILY
Qty: 30 TABLET | Refills: 11 | Status: SHIPPED | OUTPATIENT
Start: 2018-09-20

## 2018-10-04 ENCOUNTER — TELEPHONE (OUTPATIENT)
Dept: PHARMACY | Age: 53
End: 2018-10-04

## 2018-10-04 NOTE — TELEPHONE ENCOUNTER
Patient was a no call/no show for appointment today.   Spoke with patient and rescheduled for Tuesday 10/9

## 2018-10-09 ENCOUNTER — TELEPHONE (OUTPATIENT)
Dept: PHARMACY | Age: 53
End: 2018-10-09

## 2024-11-21 NOTE — TELEPHONE ENCOUNTER
Health Maintenance   Topic Date Due    Hepatitis C screen  1965    Diabetic retinal exam  09/08/1973    HIV screen  09/08/1978    DTaP/Tdap/Td vaccine (1 - Tdap) 09/08/1982    Shingles Vaccine (1 of 2 - 2 Dose Series) 09/08/2013    Flu vaccine (1) 09/01/2018    Pneumococcal med risk (1 of 1 - PPSV23) 12/31/2019 (Originally 9/8/1982)    Diabetic microalbuminuria test  11/03/2018    Diabetic foot exam  06/01/2019    A1C test (Diabetic or Prediabetic)  08/17/2019    Lipid screen  08/17/2019    Potassium monitoring  08/17/2019    Creatinine monitoring  08/17/2019    Colon cancer screen fecal DNA test (Cologuard)  06/22/2021       Hemoglobin A1C (%)   Date Value   08/17/2018 5.9   08/17/2018 5.9   05/21/2018 6.1             ( goal A1C is < 7)   No results found for: LABMICR  LDL Calculated (mg/dL)   Date Value   08/17/2018 77       (goal LDL is <100)   AST (U/L)   Date Value   08/17/2018 68     ALT (U/L)   Date Value   08/17/2018 59     BUN (mg/dL)   Date Value   08/17/2018 19     BP Readings from Last 3 Encounters:   08/28/18 (!) 144/96   07/17/18 (!) 200/100   06/01/18 (!) 154/84          (goal 120/80)    All Future Testing planned in [facility]  Lab Frequency Next Occurrence       Next Visit Date:  Future Appointments  Date Time Provider Grady Caal   11/28/2018 3:00 [facility]            Patient Active Problem List:     Hypertension     Hypercoagulable state ([facility] [address]     Gout     Venous (peripheral) insufficiency     Type 2 diabetes mellitus without complication, without long-term current use of insulin (HCC)     Mixed hyperlipidemia     Depression
2 = A lot of assistance